# Patient Record
Sex: MALE | Race: WHITE | URBAN - METROPOLITAN AREA
[De-identification: names, ages, dates, MRNs, and addresses within clinical notes are randomized per-mention and may not be internally consistent; named-entity substitution may affect disease eponyms.]

---

## 2021-01-22 ENCOUNTER — LAB OUTSIDE AN ENCOUNTER (OUTPATIENT)
Dept: URBAN - METROPOLITAN AREA TELEHEALTH 2 | Facility: TELEHEALTH | Age: 61
End: 2021-01-22

## 2021-01-22 ENCOUNTER — OFFICE VISIT (OUTPATIENT)
Dept: URBAN - METROPOLITAN AREA TELEHEALTH 2 | Facility: TELEHEALTH | Age: 61
End: 2021-01-22
Payer: COMMERCIAL

## 2021-01-22 DIAGNOSIS — K50.80 CROHN'S COLITIS: ICD-10-CM

## 2021-01-22 DIAGNOSIS — Z91.89 COLON CANCER HIGH RISK: ICD-10-CM

## 2021-01-22 DIAGNOSIS — K21.9 GERD: ICD-10-CM

## 2021-01-22 DIAGNOSIS — R10.13 DYSPEPSIA: ICD-10-CM

## 2021-01-22 PROCEDURE — G8420 CALC BMI NORM PARAMETERS: HCPCS | Performed by: INTERNAL MEDICINE

## 2021-01-22 PROCEDURE — 99215 OFFICE O/P EST HI 40 MIN: CPT | Performed by: INTERNAL MEDICINE

## 2021-01-22 PROCEDURE — G9903 PT SCRN TBCO ID AS NON USER: HCPCS | Performed by: INTERNAL MEDICINE

## 2021-01-22 PROCEDURE — G8427 DOCREV CUR MEDS BY ELIG CLIN: HCPCS | Performed by: INTERNAL MEDICINE

## 2021-01-22 PROCEDURE — 1036F TOBACCO NON-USER: CPT | Performed by: INTERNAL MEDICINE

## 2021-01-22 PROCEDURE — 3017F COLORECTAL CA SCREEN DOC REV: CPT | Performed by: INTERNAL MEDICINE

## 2021-01-22 PROCEDURE — G9622 NO UNHEAL ETOH USER: HCPCS | Performed by: INTERNAL MEDICINE

## 2021-01-22 PROCEDURE — G8482 FLU IMMUNIZE ORDER/ADMIN: HCPCS | Performed by: INTERNAL MEDICINE

## 2021-01-22 RX ORDER — PHENAZOPYRIDINE HYDROCHLORIDE 100 MG/1
TABLET, COATED ORAL
Qty: 0 | Refills: 0 | Status: ACTIVE | COMMUNITY
Start: 1900-01-01 | End: 1900-01-01

## 2021-01-22 RX ORDER — PANTOPRAZOLE SODIUM 40 MG/1
1 TABLET TABLET, DELAYED RELEASE ORAL ONCE A DAY
Qty: 90 | Refills: 4 | OUTPATIENT
Start: 2021-01-22

## 2021-01-22 RX ORDER — ROSUVASTATIN CALCIUM 10 MG
TABLET ORAL
Qty: 0 | Refills: 0 | Status: ACTIVE | COMMUNITY
Start: 1900-01-01 | End: 1900-01-01

## 2021-01-22 RX ORDER — MESALAMINE 1.2 G/1
TAKE 2 TABLETS (2.4 GRAM) BY ORAL ROUTE ONCE DAILY TABLET, DELAYED RELEASE ORAL
Qty: 180 | Refills: 4 | Status: ACTIVE | COMMUNITY
Start: 2020-04-03 | End: 2021-06-27

## 2021-01-22 RX ORDER — PANTOPRAZOLE SODIUM 40 MG/1
TAKE 1 TABLET (40 MG) BY ORAL ROUTE ONCE DAILY 30 MINUTES BEFORE BREAKFAST. FOR 90 DAYS TABLET, DELAYED RELEASE ORAL 1
Qty: 90 | Refills: 4 | Status: ACTIVE | COMMUNITY
Start: 2020-04-22 | End: 2021-07-16

## 2021-01-22 RX ORDER — MESALAMINE 375 MG/1
4 CAPSULES IN THE MORNING CAPSULE, EXTENDED RELEASE ORAL ONCE A DAY
Qty: 360 CAPSULE | Refills: 4 | OUTPATIENT
Start: 2021-01-22 | End: 2022-04-17

## 2021-01-22 RX ORDER — MESALAMINE 375 MG/1
TAKE 4 CAPSULES (1,500 MG) BY ORAL ROUTE ONCE DAILY IN THE MORNING FOR 90 DAYS CAPSULE, EXTENDED RELEASE ORAL 1
Qty: 360 | Refills: 4 | Status: ACTIVE | COMMUNITY
Start: 2020-04-22 | End: 2021-07-16

## 2021-01-22 RX ORDER — SODIUM SULFATE, MAGNESIUM SULFATE, AND POTASSIUM CHLORIDE 17.75; 2.7; 2.25 G/1; G/1; G/1
12 TABLETS TABLET ORAL
Qty: 24 TABLET | Refills: 0 | OUTPATIENT
Start: 2021-01-22 | End: 2021-01-23

## 2021-01-22 RX ORDER — TELMISARTAN 80 MG/1
TAKE 1 TABLET (80 MG) BY ORAL ROUTE ONCE DAILY TABLET ORAL 1
Qty: 0 | Refills: 0 | Status: ACTIVE | COMMUNITY
Start: 1900-01-01 | End: 1900-01-01

## 2021-01-22 NOTE — HPI-OTHER HISTORIES
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 . Pt was referred by Dr. Abbasi. . He was diagnosed with CD around age 41.  He was started on Pentasa, which helped his sxs. . Previously on 4/22/2020, pt reports that overall he has done will.  Has had a lot of reflux issues, for which he went on gluten free diet.  He has taken Protonix (as needed) and omeprazole, when he has flare of GERD, but overall has good control of his disease.  He has started low FOD-MAP diet, which helped his bloating sxs and gaseous distention. . Today on 1/22/21, pt reports that he has variable diarrhea/constipation, for which he takes OTC laxatives.  GERD is also stable.  GERD is doing well as.  . SH: occasional etoh; no cig.   (online teaching software) FH: No IBD PMH: CD, HTN, hyperlipidemia . 11/2019: A patchy area of mucosa in the terminal ileum was mildly ulcerated.  Biopsies were taken with a cold forceps for histology.  Estimated blood loss was minimal. The remainder of the exam in the terminal ileum was normal. The anus, rectum, recto-sigmoid colon, sigmoid colon, descending colon, splenic flexure, transverse colon, hepatic flexure, ascending colon, cecum, appendiceal orifice and ileocecal valve appeared normal.  Two biopsies were obtained in the cecum with cold forceps for histology.  Estimated blood loss was minimal.  A Duodenumbiopsy: Duodenalmucosawithfoveolarmetaplasia Nohistologicevidenceofceliacsprueorinfectiousmicroorganisms.    B Gastricbodybiopsy: Gastricmucosawithfeaturesconsistentwithreactivegastropathy. NoHelicobacterpylorimicroorganismsidentifiedwithaspecialstain     C Esophagogastricjunctionbiopsy: Refluxesophagitis. Analcianblue/PASstainisnegativeforbothintestinalmetaplasiaandfungalelements.    D Terminalileumbiopsy: Activeileitis Nogranulomaordysplasiaseen     E Cecumbiopsy: Colonicmucosawithnosignificanthistopathology. Nohistologicevidenceofactivecolitis,granulomataordysplasia. .

## 2021-06-07 ENCOUNTER — WEB ENCOUNTER (OUTPATIENT)
Dept: URBAN - METROPOLITAN AREA CLINIC 98 | Facility: CLINIC | Age: 61
End: 2021-06-07

## 2021-06-07 RX ORDER — POLYETHYLENE GLYCOL 3350, SODIUM SULFATE, SODIUM CHLORIDE, POTASSIUM CHLORIDE, ASCORBIC ACID, SODIUM ASCORBATE 140-9-5.2G
1 KIT KIT ORAL ONCE
Qty: 1 | Refills: 1 | OUTPATIENT
Start: 2021-06-07 | End: 2021-06-09

## 2021-06-08 ENCOUNTER — WEB ENCOUNTER (OUTPATIENT)
Dept: URBAN - METROPOLITAN AREA CLINIC 96 | Facility: CLINIC | Age: 61
End: 2021-06-08

## 2021-06-08 RX ORDER — POLYETHYLENE GLYCOL 3350, SODIUM SULFATE, SODIUM CHLORIDE, POTASSIUM CHLORIDE, ASCORBIC ACID, SODIUM ASCORBATE 140-9-5.2G
1 KIT KIT ORAL ONCE
Qty: 1 | Refills: 1
Start: 2021-06-07 | End: 2021-06-11

## 2021-06-29 ENCOUNTER — WEB ENCOUNTER (OUTPATIENT)
Dept: URBAN - METROPOLITAN AREA CLINIC 96 | Facility: CLINIC | Age: 61
End: 2021-06-29

## 2021-07-08 ENCOUNTER — OFFICE VISIT (OUTPATIENT)
Dept: URBAN - METROPOLITAN AREA SURGERY CENTER 18 | Facility: SURGERY CENTER | Age: 61
End: 2021-07-08
Payer: COMMERCIAL

## 2021-07-08 ENCOUNTER — TELEPHONE ENCOUNTER (OUTPATIENT)
Dept: URBAN - METROPOLITAN AREA SURGERY CENTER 30 | Facility: SURGERY CENTER | Age: 61
End: 2021-07-08

## 2021-07-08 DIAGNOSIS — K31.89 ACQUIRED DEFORMITY OF DUODENUM: ICD-10-CM

## 2021-07-08 DIAGNOSIS — K29.30 CHRONIC SUPERFICIAL GASTRITIS: ICD-10-CM

## 2021-07-08 DIAGNOSIS — K50.80 CROHN'S COLITIS: ICD-10-CM

## 2021-07-08 DIAGNOSIS — Z53.8 FAILED ATTEMPTED SURGICAL PROCEDURE: ICD-10-CM

## 2021-07-08 PROCEDURE — 43239 EGD BIOPSY SINGLE/MULTIPLE: CPT | Performed by: INTERNAL MEDICINE

## 2021-07-08 PROCEDURE — G8907 PT DOC NO EVENTS ON DISCHARG: HCPCS | Performed by: INTERNAL MEDICINE

## 2021-07-08 PROCEDURE — 45378 DIAGNOSTIC COLONOSCOPY: CPT | Performed by: INTERNAL MEDICINE

## 2021-07-08 RX ORDER — PHENAZOPYRIDINE HYDROCHLORIDE 100 MG/1
TABLET, COATED ORAL
Qty: 0 | Refills: 0 | Status: ACTIVE | COMMUNITY
Start: 1900-01-01 | End: 1900-01-01

## 2021-07-08 RX ORDER — PANTOPRAZOLE SODIUM 40 MG/1
1 TABLET TABLET, DELAYED RELEASE ORAL ONCE A DAY
Qty: 90 | Refills: 4 | Status: ACTIVE | COMMUNITY
Start: 2021-01-22

## 2021-07-08 RX ORDER — PANTOPRAZOLE SODIUM 40 MG/1
TAKE 1 TABLET (40 MG) BY ORAL ROUTE ONCE DAILY 30 MINUTES BEFORE BREAKFAST. FOR 90 DAYS TABLET, DELAYED RELEASE ORAL 1
Qty: 90 | Refills: 4 | Status: ACTIVE | COMMUNITY
Start: 2020-04-22 | End: 2021-07-16

## 2021-07-08 RX ORDER — POLYETHYLENE GLYCOL 3350, SODIUM SULFATE ANHYDROUS, SODIUM BICARBONATE, SODIUM CHLORIDE, POTASSIUM CHLORIDE 236; 22.74; 6.74; 5.86; 2.97 G/4L; G/4L; G/4L; G/4L; G/4L
1 BOTTLE POWDER, FOR SOLUTION ORAL ONCE
Qty: 1 KIT | Refills: 0 | OUTPATIENT
Start: 2021-07-09 | End: 2021-07-10

## 2021-07-08 RX ORDER — TELMISARTAN 80 MG/1
TAKE 1 TABLET (80 MG) BY ORAL ROUTE ONCE DAILY TABLET ORAL 1
Qty: 0 | Refills: 0 | Status: ACTIVE | COMMUNITY
Start: 1900-01-01 | End: 1900-01-01

## 2021-07-08 RX ORDER — ROSUVASTATIN CALCIUM 10 MG
TABLET ORAL
Qty: 0 | Refills: 0 | Status: ACTIVE | COMMUNITY
Start: 1900-01-01 | End: 1900-01-01

## 2021-07-08 RX ORDER — MESALAMINE 375 MG/1
4 CAPSULES IN THE MORNING CAPSULE, EXTENDED RELEASE ORAL ONCE A DAY
Qty: 360 CAPSULE | Refills: 4 | Status: ACTIVE | COMMUNITY
Start: 2021-01-22 | End: 2022-04-17

## 2021-07-08 RX ORDER — MESALAMINE 375 MG/1
TAKE 4 CAPSULES (1,500 MG) BY ORAL ROUTE ONCE DAILY IN THE MORNING FOR 90 DAYS CAPSULE, EXTENDED RELEASE ORAL 1
Qty: 360 | Refills: 4 | Status: ACTIVE | COMMUNITY
Start: 2020-04-22 | End: 2021-07-16

## 2021-07-09 ENCOUNTER — LAB OUTSIDE AN ENCOUNTER (OUTPATIENT)
Dept: URBAN - METROPOLITAN AREA SURGERY CENTER 30 | Facility: SURGERY CENTER | Age: 61
End: 2021-07-09

## 2021-10-15 ENCOUNTER — OFFICE VISIT (OUTPATIENT)
Dept: URBAN - METROPOLITAN AREA MEDICAL CENTER 28 | Facility: MEDICAL CENTER | Age: 61
End: 2021-10-15
Payer: COMMERCIAL

## 2021-10-15 DIAGNOSIS — K50.80 CROHN'S COLITIS: ICD-10-CM

## 2021-10-15 DIAGNOSIS — D12.2 ADENOMA OF ASCENDING COLON: ICD-10-CM

## 2021-10-15 DIAGNOSIS — K63.89 BACTERIAL OVERGROWTH SYNDROME: ICD-10-CM

## 2021-10-15 PROCEDURE — 45380 COLONOSCOPY AND BIOPSY: CPT | Performed by: INTERNAL MEDICINE

## 2021-10-15 PROCEDURE — 45385 COLONOSCOPY W/LESION REMOVAL: CPT | Performed by: INTERNAL MEDICINE

## 2021-10-15 RX ORDER — ROSUVASTATIN CALCIUM 10 MG
TABLET ORAL
Qty: 0 | Refills: 0 | Status: ACTIVE | COMMUNITY
Start: 1900-01-01 | End: 1900-01-01

## 2021-10-15 RX ORDER — PANTOPRAZOLE SODIUM 40 MG/1
1 TABLET TABLET, DELAYED RELEASE ORAL ONCE A DAY
Qty: 90 | Refills: 4 | Status: ACTIVE | COMMUNITY
Start: 2021-01-22

## 2021-10-15 RX ORDER — PHENAZOPYRIDINE HYDROCHLORIDE 100 MG/1
TABLET, COATED ORAL
Qty: 0 | Refills: 0 | Status: ACTIVE | COMMUNITY
Start: 1900-01-01 | End: 1900-01-01

## 2021-10-15 RX ORDER — MESALAMINE 375 MG/1
4 CAPSULES IN THE MORNING CAPSULE, EXTENDED RELEASE ORAL ONCE A DAY
Qty: 360 CAPSULE | Refills: 4 | Status: ACTIVE | COMMUNITY
Start: 2021-01-22 | End: 2022-04-17

## 2021-10-15 RX ORDER — TELMISARTAN 80 MG/1
TAKE 1 TABLET (80 MG) BY ORAL ROUTE ONCE DAILY TABLET ORAL 1
Qty: 0 | Refills: 0 | Status: ACTIVE | COMMUNITY
Start: 1900-01-01 | End: 1900-01-01

## 2021-11-15 ENCOUNTER — OFFICE VISIT (OUTPATIENT)
Dept: URBAN - METROPOLITAN AREA TELEHEALTH 2 | Facility: TELEHEALTH | Age: 61
End: 2021-11-15
Payer: COMMERCIAL

## 2021-11-15 DIAGNOSIS — K50.80 CROHN'S COLITIS: ICD-10-CM

## 2021-11-15 DIAGNOSIS — K21.9 GERD: ICD-10-CM

## 2021-11-15 DIAGNOSIS — Z91.89 COLON CANCER HIGH RISK: ICD-10-CM

## 2021-11-15 DIAGNOSIS — R10.13 DYSPEPSIA: ICD-10-CM

## 2021-11-15 PROCEDURE — 99214 OFFICE O/P EST MOD 30 MIN: CPT | Performed by: INTERNAL MEDICINE

## 2021-11-15 RX ORDER — PANTOPRAZOLE SODIUM 40 MG/1
1 TABLET TABLET, DELAYED RELEASE ORAL ONCE A DAY
Qty: 90 | Refills: 4 | OUTPATIENT

## 2021-11-15 RX ORDER — PANTOPRAZOLE SODIUM 40 MG/1
1 TABLET TABLET, DELAYED RELEASE ORAL ONCE A DAY
Qty: 90 | Refills: 4 | Status: ACTIVE | COMMUNITY
Start: 2021-01-22

## 2021-11-15 RX ORDER — ROSUVASTATIN CALCIUM 10 MG
TABLET ORAL
Qty: 0 | Refills: 0 | Status: ACTIVE | COMMUNITY
Start: 1900-01-01

## 2021-11-15 RX ORDER — TELMISARTAN 80 MG/1
TAKE 1 TABLET (80 MG) BY ORAL ROUTE ONCE DAILY TABLET ORAL 1
Qty: 0 | Refills: 0 | Status: ACTIVE | COMMUNITY
Start: 1900-01-01

## 2021-11-15 RX ORDER — MESALAMINE 375 MG/1
4 CAPSULES IN THE MORNING CAPSULE, EXTENDED RELEASE ORAL ONCE A DAY
Qty: 360 CAPSULE | Refills: 4 | Status: ACTIVE | COMMUNITY
Start: 2021-01-22 | End: 2022-04-17

## 2021-11-15 RX ORDER — MESALAMINE 375 MG/1
4 CAPSULES IN THE MORNING CAPSULE, EXTENDED RELEASE ORAL ONCE A DAY
Qty: 360 CAPSULE | Refills: 4 | OUTPATIENT

## 2021-11-15 RX ORDER — PHENAZOPYRIDINE HYDROCHLORIDE 100 MG/1
TABLET, COATED ORAL
Qty: 0 | Refills: 0 | Status: ACTIVE | COMMUNITY
Start: 1900-01-01

## 2021-11-15 NOTE — HPI-TODAY'S VISIT:
Ronald Sepulveda is a 62 y/o indiv with ileal CD, currently on Apriso , here for evaluation of his condition. . Pt was referred by Dr. Abbasi. . He was diagnosed with CD around age 41.  He was started on Pentasa, which helped his sxs. . Previously on 4/22/2020, pt reports that overall he has done will.  Has had a lot of reflux issues, for which he went on gluten free diet.  He has taken Protonix (as needed) and omeprazole, when he has flare of GERD, but overall has good control of his disease.  He has started low FOD-MAP diet, which helped his bloating sxs and gaseous distention. . Previously on 1/22/21, pt reports that he has variable diarrhea/constipation, for which he takes OTC laxatives.  GERD is also stable.  GERD is doing well as. . Today on 11/15/2021, pt reports that he is doing very well.  Occasional constipation, no rectal bleeding, no diarrhea.  GERD doing well. . SH: occasional etoh; no cig.   (online teaching software) FH: No IBD PMH: CD, HTN, hyperlipidemia . 10/2021: Colonoscopy: Normal terminal ileum, no active inflammation. Tubular adenoma removed. . 11/2019: A patchy area of mucosa in the terminal ileum was mildly ulcerated.  Biopsies were taken with a cold forceps for histology.  Estimated blood loss was minimal. The remainder of the exam in the terminal ileum was normal. The anus, rectum, recto-sigmoid colon, sigmoid colon, descending colon, splenic flexure, transverse colon, hepatic flexure, ascending colon, cecum, appendiceal orifice and ileocecal valve appeared normal.  Two biopsies were obtained in the cecum with cold forceps for histology.  Estimated blood loss was minimal.  A Duodenumbiopsy: Duodenalmucosawithfoveolarmetaplasia Nohistologicevidenceofceliacsprueorinfectiousmicroorganisms.    B Gastricbodybiopsy: Gastricmucosawithfeaturesconsistentwithreactivegastropathy. NoHelicobacterpylorimicroorganismsidentifiedwithaspecialstain     C Esophagogastricjunctionbiopsy: Refluxesophagitis. Analcianblue/PASstainisnegativeforbothintestinalmetaplasiaandfungalelements.    D Terminalileumbiopsy: Activeileitis Nogranulomaordysplasiaseen     E Cecumbiopsy: Colonicmucosawithnosignificanthistopathology. Nohistologicevidenceofactivecolitis,granulomataordysplasia.

## 2022-05-04 ENCOUNTER — WEB ENCOUNTER (OUTPATIENT)
Dept: URBAN - METROPOLITAN AREA CLINIC 96 | Facility: CLINIC | Age: 62
End: 2022-05-04

## 2022-05-05 ENCOUNTER — WEB ENCOUNTER (OUTPATIENT)
Dept: URBAN - METROPOLITAN AREA CLINIC 96 | Facility: CLINIC | Age: 62
End: 2022-05-05

## 2022-05-10 ENCOUNTER — OFFICE VISIT (OUTPATIENT)
Dept: URBAN - METROPOLITAN AREA CLINIC 96 | Facility: CLINIC | Age: 62
End: 2022-05-10

## 2022-05-20 ENCOUNTER — WEB ENCOUNTER (OUTPATIENT)
Dept: URBAN - METROPOLITAN AREA TELEHEALTH 2 | Facility: TELEHEALTH | Age: 62
End: 2022-05-20

## 2022-05-26 ENCOUNTER — OFFICE VISIT (OUTPATIENT)
Dept: URBAN - METROPOLITAN AREA TELEHEALTH 2 | Facility: TELEHEALTH | Age: 62
End: 2022-05-26
Payer: COMMERCIAL

## 2022-05-26 DIAGNOSIS — Z91.89 COLON CANCER HIGH RISK: ICD-10-CM

## 2022-05-26 DIAGNOSIS — K50.90 CROHN DISEASE: ICD-10-CM

## 2022-05-26 DIAGNOSIS — R10.13 DYSPEPSIA: ICD-10-CM

## 2022-05-26 DIAGNOSIS — K50.80 CROHN'S COLITIS: ICD-10-CM

## 2022-05-26 DIAGNOSIS — K21.9 GERD: ICD-10-CM

## 2022-05-26 DIAGNOSIS — Z09 FOLLOW UP: ICD-10-CM

## 2022-05-26 DIAGNOSIS — Z71.89 VACCINE COUNSELING: ICD-10-CM

## 2022-05-26 PROCEDURE — 99214 OFFICE O/P EST MOD 30 MIN: CPT | Performed by: INTERNAL MEDICINE

## 2022-05-26 RX ORDER — MESALAMINE 375 MG/1
4 CAPSULES IN THE MORNING CAPSULE, EXTENDED RELEASE ORAL ONCE A DAY
Qty: 360 CAPSULE | Refills: 4 | Status: ACTIVE | COMMUNITY

## 2022-05-26 RX ORDER — HYOSCYAMINE SULFATE 0.12 MG/1
1 TABLET AS NEEDED TABLET ORAL
Qty: 60 | Refills: 3 | OUTPATIENT
Start: 2022-05-26 | End: 2022-09-23

## 2022-05-26 RX ORDER — MESALAMINE 375 MG/1
4 CAPSULES IN THE MORNING CAPSULE, EXTENDED RELEASE ORAL ONCE A DAY
Qty: 360 CAPSULE | Refills: 4 | OUTPATIENT

## 2022-05-26 RX ORDER — ROSUVASTATIN CALCIUM 10 MG
TABLET ORAL
Qty: 0 | Refills: 0 | Status: ACTIVE | COMMUNITY
Start: 1900-01-01

## 2022-05-26 RX ORDER — TELMISARTAN 80 MG/1
TAKE 1 TABLET (80 MG) BY ORAL ROUTE ONCE DAILY TABLET ORAL 1
Qty: 0 | Refills: 0 | Status: ACTIVE | COMMUNITY
Start: 1900-01-01

## 2022-05-26 RX ORDER — PANTOPRAZOLE SODIUM 40 MG/1
1 TABLET TABLET, DELAYED RELEASE ORAL ONCE A DAY
Qty: 90 | Refills: 4 | Status: ACTIVE | COMMUNITY

## 2022-05-26 RX ORDER — PHENAZOPYRIDINE HYDROCHLORIDE 100 MG/1
TABLET, COATED ORAL
Qty: 0 | Refills: 0 | Status: ACTIVE | COMMUNITY
Start: 1900-01-01

## 2022-05-26 RX ORDER — PANTOPRAZOLE SODIUM 40 MG/1
1 TABLET TABLET, DELAYED RELEASE ORAL ONCE A DAY
Qty: 90 | Refills: 4 | OUTPATIENT

## 2022-05-26 NOTE — HPI-TODAY'S VISIT:
Ronald Sepulveda is a 63 y/o indiv with ileal CD, currently on Apriso , here for evaluation of his condition. . Pt was referred by Dr. Abbasi. . He was diagnosed with CD around age 41.  He was started on Pentasa, which helped his sxs. . Previously on 4/22/2020, pt reports that overall he has done will.  Has had a lot of reflux issues, for which he went on gluten free diet.  He has taken Protonix (as needed) and omeprazole, when he has flare of GERD, but overall has good control of his disease.  He has started low FOD-MAP diet, which helped his bloating sxs and gaseous distention. . Previously on 1/22/21, pt reports that he has variable diarrhea/constipation, for which he takes OTC laxatives.  GERD is also stable.  GERD is doing well as. . Previously on 11/15/2021, pt reports that he is doing very well.  Occasional constipation, no rectal bleeding, no diarrhea.  GERD doing well. . Today on 5/26/2022, pt reports that his PCP changed his protonix to Pepcid due to concerns of osteoporosis.  He has been doing okay on the Pepcid.  He was dxd with prostatitis and is going to be taking Bactrim.    SH: occasional etoh; no cig.   (online teaching software) FH: No IBD PMH: CD, HTN, hyperlipidemia . 10/2021: Colonoscopy: Normal terminal ileum, no active inflammation. Tubular adenoma removed. . 11/2019: A patchy area of mucosa in the terminal ileum was mildly ulcerated.  Biopsies were taken with a cold forceps for histology.  Estimated blood loss was minimal. The remainder of the exam in the terminal ileum was normal. The anus, rectum, recto-sigmoid colon, sigmoid colon, descending colon, splenic flexure, transverse colon, hepatic flexure, ascending colon, cecum, appendiceal orifice and ileocecal valve appeared normal.  Two biopsies were obtained in the cecum with cold forceps for histology.  Estimated blood loss was minimal.  A Duodenumbiopsy: Duodenalmucosawithfoveolarmetaplasia Nohistologicevidenceofceliacsprueorinfectiousmicroorganisms.    B Gastricbodybiopsy: Gastricmucosawithfeaturesconsistentwithreactivegastropathy. NoHelicobacterpylorimicroorganismsidentifiedwithaspecialstain     C Esophagogastricjunctionbiopsy: Refluxesophagitis. Analcianblue/PASstainisnegativeforbothintestinalmetaplasiaandfungalelements.    D Terminalileumbiopsy: Activeileitis Nogranulomaordysplasiaseen     E Cecumbiopsy: Colonicmucosawithnosignificanthistopathology. Nohistologicevidenceofactivecolitis,granulomataordysplasia. .

## 2023-03-22 ENCOUNTER — OFFICE VISIT (OUTPATIENT)
Dept: URBAN - METROPOLITAN AREA CLINIC 96 | Facility: CLINIC | Age: 63
End: 2023-03-22

## 2023-06-14 ENCOUNTER — OFFICE VISIT (OUTPATIENT)
Dept: URBAN - METROPOLITAN AREA CLINIC 96 | Facility: CLINIC | Age: 63
End: 2023-06-14

## 2023-07-11 ENCOUNTER — TELEPHONE ENCOUNTER (OUTPATIENT)
Dept: URBAN - METROPOLITAN AREA CLINIC 96 | Facility: CLINIC | Age: 63
End: 2023-07-11

## 2023-07-11 ENCOUNTER — WEB ENCOUNTER (OUTPATIENT)
Dept: URBAN - METROPOLITAN AREA CLINIC 96 | Facility: CLINIC | Age: 63
End: 2023-07-11

## 2023-07-11 ENCOUNTER — LAB OUTSIDE AN ENCOUNTER (OUTPATIENT)
Dept: URBAN - METROPOLITAN AREA CLINIC 96 | Facility: CLINIC | Age: 63
End: 2023-07-11

## 2023-07-11 ENCOUNTER — DASHBOARD ENCOUNTERS (OUTPATIENT)
Age: 63
End: 2023-07-11

## 2023-07-11 ENCOUNTER — OFFICE VISIT (OUTPATIENT)
Dept: URBAN - METROPOLITAN AREA CLINIC 96 | Facility: CLINIC | Age: 63
End: 2023-07-11
Payer: COMMERCIAL

## 2023-07-11 VITALS
HEIGHT: 68 IN | WEIGHT: 207 LBS | SYSTOLIC BLOOD PRESSURE: 114 MMHG | BODY MASS INDEX: 31.37 KG/M2 | TEMPERATURE: 98.6 F | HEART RATE: 78 BPM | DIASTOLIC BLOOD PRESSURE: 75 MMHG

## 2023-07-11 DIAGNOSIS — Z91.89 COLON CANCER HIGH RISK: ICD-10-CM

## 2023-07-11 DIAGNOSIS — K21.9 GERD: ICD-10-CM

## 2023-07-11 DIAGNOSIS — R10.13 DYSPEPSIA: ICD-10-CM

## 2023-07-11 DIAGNOSIS — K50.80 CROHN'S COLITIS: ICD-10-CM

## 2023-07-11 DIAGNOSIS — Z09 FOLLOW UP: ICD-10-CM

## 2023-07-11 PROCEDURE — 99214 OFFICE O/P EST MOD 30 MIN: CPT | Performed by: INTERNAL MEDICINE

## 2023-07-11 RX ORDER — POLYETHYLENE GLYCOL 3350, SODIUM SULFATE ANHYDROUS, SODIUM BICARBONATE, SODIUM CHLORIDE, POTASSIUM CHLORIDE 236; 22.74; 6.74; 5.86; 2.97 G/4L; G/4L; G/4L; G/4L; G/4L
1 BOTTLE POWDER, FOR SOLUTION ORAL ONCE
Qty: 1 KIT | Refills: 0 | OUTPATIENT
Start: 2023-07-11 | End: 2023-07-12

## 2023-07-11 RX ORDER — PANTOPRAZOLE SODIUM 40 MG/1
1 TABLET TABLET, DELAYED RELEASE ORAL ONCE A DAY
Qty: 90 | Refills: 4 | Status: ACTIVE | COMMUNITY

## 2023-07-11 RX ORDER — MESALAMINE 375 MG/1
4 CAPSULES IN THE MORNING CAPSULE, EXTENDED RELEASE ORAL ONCE A DAY
Qty: 360 CAPSULE | Refills: 4 | Status: ACTIVE | COMMUNITY

## 2023-07-11 RX ORDER — MESALAMINE 375 MG/1
4 CAPSULES IN THE MORNING CAPSULE, EXTENDED RELEASE ORAL ONCE A DAY
Qty: 360 CAPSULE | Refills: 4 | OUTPATIENT

## 2023-07-11 RX ORDER — PHENAZOPYRIDINE HYDROCHLORIDE 100 MG/1
TABLET, COATED ORAL
Qty: 0 | Refills: 0 | Status: ACTIVE | COMMUNITY
Start: 1900-01-01

## 2023-07-11 RX ORDER — ROSUVASTATIN CALCIUM 10 MG
TABLET ORAL
Qty: 0 | Refills: 0 | Status: ACTIVE | COMMUNITY
Start: 1900-01-01

## 2023-07-11 RX ORDER — PANTOPRAZOLE SODIUM 40 MG/1
1 TABLET TABLET, DELAYED RELEASE ORAL ONCE A DAY
Qty: 90 | Refills: 4 | OUTPATIENT

## 2023-07-11 RX ORDER — TELMISARTAN 80 MG/1
TAKE 1 TABLET (80 MG) BY ORAL ROUTE ONCE DAILY TABLET ORAL 1
Qty: 0 | Refills: 0 | Status: ACTIVE | COMMUNITY
Start: 1900-01-01

## 2023-07-11 NOTE — HPI-TODAY'S VISIT:
Pt Coco is a 64 y/o indiv with ileal CD, currently on Apriso , here for evaluation of his condition. . Pt was referred by Dr. Abbasi. . He was diagnosed with CD around age 41.  He was started on Pentasa, which helped his sxs. . Previously on 4/22/2020, pt reports that overall he has done will.  Has had a lot of reflux issues, for which he went on gluten free diet.  He has taken Protonix (as needed) and omeprazole, when he has flare of GERD, but overall has good control of his disease.  He has started low FOD-MAP diet, which helped his bloating sxs and gaseous distention. . Previously on 1/22/21, pt reports that he has variable diarrhea/constipation, for which he takes OTC laxatives.  GERD is also stable.  GERD is doing well as. . Previously on 11/15/2021, pt reports that he is doing very well.  Occasional constipation, no rectal bleeding, no diarrhea.  GERD doing well. . Previously on 5/26/2022, pt reports that his PCP changed his protonix to Pepcid due to concerns of osteoporosis.  He has been doing okay on the Pepcid.  He was dxd with prostatitis and is going to be taking Bactrim.   . Today on 7/11/2023, pt reports that he gets occasional stool balls/constipation, does take some fiber;  Occasional diarrhea episode.  No abd pain.  No blood in the stool.  No longer having prostate issues.  SH: occasional etoh; no cig.   (online teaching software) FH: No IBD PMH: CD, HTN, hyperlipidemia . 10/2021: Colonoscopy: Normal terminal ileum, no active inflammation. Tubular adenoma removed. . 11/2019: A patchy area of mucosa in the terminal ileum was mildly ulcerated.  Biopsies were taken with a cold forceps for histology.  Estimated blood loss was minimal. The remainder of the exam in the terminal ileum was normal. The anus, rectum, recto-sigmoid colon, sigmoid colon, descending colon, splenic flexure, transverse colon, hepatic flexure, ascending colon, cecum, appendiceal orifice and ileocecal valve appeared normal.  Two biopsies were obtained in the cecum with cold forceps for histology.  Estimated blood loss was minimal.  A Duodenumbiopsy: Duodenalmucosawithfoveolarmetaplasia Nohistologicevidenceofceliacsprueorinfectiousmicroorganisms.    B Gastricbodybiopsy: Gastricmucosawithfeaturesconsistentwithreactivegastropathy. NoHelicobacterpylorimicroorganismsidentifiedwithaspecialstain     C Esophagogastricjunctionbiopsy: Refluxesophagitis. Analcianblue/PASstainisnegativeforbothintestinalmetaplasiaandfungalelements.    D Terminalileumbiopsy: Activeileitis Nogranulomaordysplasiaseen     E Cecumbiopsy: Colonicmucosawithnosignificanthistopathology. Nohistologicevidenceofactivecolitis,granulomataordysplasia. . SH: on [employer] of [employer]

## 2023-08-11 ENCOUNTER — OFFICE VISIT (OUTPATIENT)
Dept: URBAN - METROPOLITAN AREA MEDICAL CENTER 28 | Facility: MEDICAL CENTER | Age: 63
End: 2023-08-11
Payer: COMMERCIAL

## 2023-08-11 ENCOUNTER — WEB ENCOUNTER (OUTPATIENT)
Dept: URBAN - METROPOLITAN AREA CLINIC 96 | Facility: CLINIC | Age: 63
End: 2023-08-11

## 2023-08-11 DIAGNOSIS — K50.00 CICATRIZING ENTEROCOLITIS: ICD-10-CM

## 2023-08-11 DIAGNOSIS — K30 ACID INDIGESTION: ICD-10-CM

## 2023-08-11 DIAGNOSIS — K31.89 ACQUIRED DEFORMITY OF DUODENUM: ICD-10-CM

## 2023-08-11 DIAGNOSIS — K21.9 ACID REFLUX: ICD-10-CM

## 2023-08-11 PROCEDURE — 43239 EGD BIOPSY SINGLE/MULTIPLE: CPT | Performed by: INTERNAL MEDICINE

## 2023-08-11 PROCEDURE — 45378 DIAGNOSTIC COLONOSCOPY: CPT | Performed by: INTERNAL MEDICINE

## 2023-08-11 RX ORDER — MESALAMINE 375 MG/1
4 CAPSULES IN THE MORNING CAPSULE, EXTENDED RELEASE ORAL ONCE A DAY
Qty: 360 CAPSULE | Refills: 4 | Status: ACTIVE | COMMUNITY

## 2023-08-11 RX ORDER — POLYETHYLENE GLYCOL 3350, SODIUM SULFATE ANHYDROUS, SODIUM BICARBONATE, SODIUM CHLORIDE, POTASSIUM CHLORIDE 236; 22.74; 6.74; 5.86; 2.97 G/4L; G/4L; G/4L; G/4L; G/4L
1 BOTTLE POWDER, FOR SOLUTION ORAL ONCE
Qty: 1 KIT | Refills: 0 | OUTPATIENT
Start: 2023-08-16 | End: 2023-08-17

## 2023-08-11 RX ORDER — ROSUVASTATIN CALCIUM 10 MG
TABLET ORAL
Qty: 0 | Refills: 0 | Status: ACTIVE | COMMUNITY
Start: 1900-01-01

## 2023-08-11 RX ORDER — PHENAZOPYRIDINE HYDROCHLORIDE 100 MG/1
TABLET, COATED ORAL
Qty: 0 | Refills: 0 | Status: ACTIVE | COMMUNITY
Start: 1900-01-01

## 2023-08-11 RX ORDER — PANTOPRAZOLE SODIUM 40 MG/1
1 TABLET TABLET, DELAYED RELEASE ORAL ONCE A DAY
Qty: 90 | Refills: 4 | Status: ACTIVE | COMMUNITY

## 2023-08-11 RX ORDER — TELMISARTAN 80 MG/1
TAKE 1 TABLET (80 MG) BY ORAL ROUTE ONCE DAILY TABLET ORAL 1
Qty: 0 | Refills: 0 | Status: ACTIVE | COMMUNITY
Start: 1900-01-01

## 2023-08-16 ENCOUNTER — LAB OUTSIDE AN ENCOUNTER (OUTPATIENT)
Dept: URBAN - METROPOLITAN AREA CLINIC 96 | Facility: CLINIC | Age: 63
End: 2023-08-16

## 2023-08-18 ENCOUNTER — TELEPHONE ENCOUNTER (OUTPATIENT)
Dept: URBAN - METROPOLITAN AREA CLINIC 96 | Facility: CLINIC | Age: 63
End: 2023-08-18

## 2023-09-26 ENCOUNTER — WEB ENCOUNTER (OUTPATIENT)
Dept: URBAN - METROPOLITAN AREA CLINIC 96 | Facility: CLINIC | Age: 63
End: 2023-09-26

## 2023-09-29 ENCOUNTER — OFFICE VISIT (OUTPATIENT)
Dept: URBAN - METROPOLITAN AREA MEDICAL CENTER 28 | Facility: MEDICAL CENTER | Age: 63
End: 2023-09-29
Payer: COMMERCIAL

## 2023-09-29 ENCOUNTER — LAB OUTSIDE AN ENCOUNTER (OUTPATIENT)
Dept: URBAN - METROPOLITAN AREA CLINIC 96 | Facility: CLINIC | Age: 63
End: 2023-09-29

## 2023-09-29 DIAGNOSIS — K63.89 APPENDICITIS EPIPLOICA: ICD-10-CM

## 2023-09-29 DIAGNOSIS — K50.80 CROHN'S COLITIS: ICD-10-CM

## 2023-09-29 LAB
GLUCOSE POC: 103
PERFORMING LAB: (no result)

## 2023-09-29 PROCEDURE — 45380 COLONOSCOPY AND BIOPSY: CPT | Performed by: INTERNAL MEDICINE

## 2023-09-29 RX ORDER — PANTOPRAZOLE SODIUM 40 MG/1
1 TABLET TABLET, DELAYED RELEASE ORAL ONCE A DAY
Qty: 90 | Refills: 4 | Status: ACTIVE | COMMUNITY

## 2023-09-29 RX ORDER — MESALAMINE 375 MG/1
4 CAPSULES IN THE MORNING CAPSULE, EXTENDED RELEASE ORAL ONCE A DAY
Qty: 360 CAPSULE | Refills: 4 | Status: ACTIVE | COMMUNITY

## 2023-09-29 RX ORDER — PHENAZOPYRIDINE HYDROCHLORIDE 100 MG/1
TABLET, COATED ORAL
Qty: 0 | Refills: 0 | Status: ACTIVE | COMMUNITY
Start: 1900-01-01

## 2023-09-29 RX ORDER — TELMISARTAN 80 MG/1
TAKE 1 TABLET (80 MG) BY ORAL ROUTE ONCE DAILY TABLET ORAL 1
Qty: 0 | Refills: 0 | Status: ACTIVE | COMMUNITY
Start: 1900-01-01

## 2023-09-29 RX ORDER — ROSUVASTATIN CALCIUM 10 MG
TABLET ORAL
Qty: 0 | Refills: 0 | Status: ACTIVE | COMMUNITY
Start: 1900-01-01

## 2023-09-29 NOTE — HPI-TODAY'S VISIT:
9/29/2023:  We used  a small bowel enteroscope which allowed us to get to cecum.  We were not able to intubate the ileum  (due to lack of tension in the scope). - A less than 5 mm polyp was found in the ascending colon.  The polyp was sessile.  The polyp was removed with a  jumbo cold forceps.  Resection and retrieval were complete.  The pathology specimen was placed into Bottle Number 1.  - The exam was otherwise without abnormality on direct and retroflexion views.  No active Crohns seen.

## 2023-10-04 ENCOUNTER — WEB ENCOUNTER (OUTPATIENT)
Dept: URBAN - METROPOLITAN AREA CLINIC 96 | Facility: CLINIC | Age: 63
End: 2023-10-04

## 2023-10-04 RX ORDER — MESALAMINE 375 MG/1
4 CAPSULES IN THE MORNING CAPSULE, EXTENDED RELEASE ORAL ONCE A DAY
Qty: 360 CAPSULE | Refills: 4 | OUTPATIENT
Start: 2023-10-05 | End: 2024-12-27

## 2023-10-04 RX ORDER — MESALAMINE 375 MG/1
4 CAPSULES IN THE MORNING CAPSULE, EXTENDED RELEASE ORAL ONCE A DAY
Qty: 360 | Refills: 0
End: 2024-01-03

## 2023-10-05 ENCOUNTER — WEB ENCOUNTER (OUTPATIENT)
Dept: URBAN - METROPOLITAN AREA CLINIC 98 | Facility: CLINIC | Age: 63
End: 2023-10-05

## 2023-10-05 RX ORDER — MESALAMINE 375 MG/1
4 CAPSULES IN THE MORNING CAPSULE, EXTENDED RELEASE ORAL ONCE A DAY
Qty: 360 | Refills: 4
Start: 2023-10-05 | End: 2024-12-27

## 2023-10-06 ENCOUNTER — WEB ENCOUNTER (OUTPATIENT)
Dept: URBAN - METROPOLITAN AREA CLINIC 96 | Facility: CLINIC | Age: 63
End: 2023-10-06

## 2023-10-16 ENCOUNTER — WEB ENCOUNTER (OUTPATIENT)
Dept: URBAN - METROPOLITAN AREA CLINIC 96 | Facility: CLINIC | Age: 63
End: 2023-10-16

## 2024-01-11 ENCOUNTER — WEB ENCOUNTER (OUTPATIENT)
Dept: URBAN - METROPOLITAN AREA CLINIC 98 | Facility: CLINIC | Age: 64
End: 2024-01-11

## 2024-10-09 ENCOUNTER — OFFICE VISIT (OUTPATIENT)
Dept: URBAN - METROPOLITAN AREA CLINIC 96 | Facility: CLINIC | Age: 64
End: 2024-10-09

## 2024-11-27 ENCOUNTER — OFFICE VISIT (OUTPATIENT)
Dept: URBAN - METROPOLITAN AREA CLINIC 96 | Facility: CLINIC | Age: 64
End: 2024-11-27
Payer: COMMERCIAL

## 2024-11-27 VITALS
BODY MASS INDEX: 29.98 KG/M2 | DIASTOLIC BLOOD PRESSURE: 87 MMHG | WEIGHT: 197.8 LBS | HEIGHT: 68 IN | HEART RATE: 75 BPM | SYSTOLIC BLOOD PRESSURE: 138 MMHG

## 2024-11-27 DIAGNOSIS — R10.13 DYSPEPSIA: ICD-10-CM

## 2024-11-27 DIAGNOSIS — Z09 FOLLOW UP: ICD-10-CM

## 2024-11-27 DIAGNOSIS — Z91.89 COLON CANCER HIGH RISK: ICD-10-CM

## 2024-11-27 DIAGNOSIS — K21.9 GERD: ICD-10-CM

## 2024-11-27 DIAGNOSIS — K50.00 CROHN'S DISEASE OF ILEUM WITHOUT COMPLICATION: ICD-10-CM

## 2024-11-27 PROBLEM — 38106008: Status: ACTIVE | Noted: 2024-11-27

## 2024-11-27 PROCEDURE — 99213 OFFICE O/P EST LOW 20 MIN: CPT | Performed by: INTERNAL MEDICINE

## 2024-11-27 RX ORDER — TELMISARTAN 80 MG/1
TAKE 1 TABLET (80 MG) BY ORAL ROUTE ONCE DAILY TABLET ORAL 1
Qty: 0 | Refills: 0 | Status: ACTIVE | COMMUNITY
Start: 1900-01-01

## 2024-11-27 RX ORDER — PANTOPRAZOLE SODIUM 40 MG/1
1 TABLET TABLET, DELAYED RELEASE ORAL ONCE A DAY
Qty: 90 | Refills: 4 | Status: ACTIVE | COMMUNITY

## 2024-11-27 RX ORDER — PHENAZOPYRIDINE HYDROCHLORIDE 100 MG/1
TABLET, COATED ORAL
Qty: 0 | Refills: 0 | Status: ACTIVE | COMMUNITY
Start: 1900-01-01

## 2024-11-27 RX ORDER — MESALAMINE 375 MG/1
4 CAPSULES IN THE MORNING CAPSULE, EXTENDED RELEASE ORAL ONCE A DAY
Qty: 360 | Refills: 4 | Status: ACTIVE | COMMUNITY
Start: 2023-10-05 | End: 2024-12-27

## 2024-11-27 RX ORDER — ROSUVASTATIN CALCIUM 10 MG
TABLET ORAL
Qty: 0 | Refills: 0 | Status: ACTIVE | COMMUNITY
Start: 1900-01-01

## 2024-11-27 RX ORDER — PANTOPRAZOLE SODIUM 40 MG/1
1 TABLET TABLET, DELAYED RELEASE ORAL ONCE A DAY
Qty: 90 | Refills: 4 | OUTPATIENT

## 2024-11-27 RX ORDER — MESALAMINE 375 MG/1
4 CAPSULES IN THE MORNING CAPSULE, EXTENDED RELEASE ORAL ONCE A DAY
Qty: 360 CAPSULE | Refills: 4 | OUTPATIENT

## 2024-11-27 NOTE — HPI-TODAY'S VISIT:
Pt Coco is a 63 y/o indiv with ileal CD, currently on Apriso , here for evaluation of his condition. . Pt was referred by Dr. Abbasi. . He was diagnosed with CD around age 41.  He was started on Pentasa, which helped his sxs. . Previously on 4/22/2020, pt reports that overall he has done will.  Has had a lot of reflux issues, for which he went on gluten free diet.  He has taken Protonix (as needed) and omeprazole, when he has flare of GERD, but overall has good control of his disease.  He has started low FOD-MAP diet, which helped his bloating sxs and gaseous distention. . Previously on 1/22/21, pt reports that he has variable diarrhea/constipation, for which he takes OTC laxatives.  GERD is also stable.  GERD is doing well as. . Previously on 11/15/2021, pt reports that he is doing very well.  Occasional constipation, no rectal bleeding, no diarrhea.  GERD doing well. . Previously on 5/26/2022, pt reports that his PCP changed his protonix to Pepcid due to concerns of osteoporosis.  He has been doing okay on the Pepcid.  He was dxd with prostatitis and is going to be taking Bactrim.   . Prev on 7/11/2023, pt reports that he gets occasional stool balls/constipation, does take some fiber;  Occasional diarrhea episode.  No abd pain.  No blood in the stool.  No longer having prostate issues. . Today on 11/27/2024, pt reports that he has started zepbound, has seen some GERD sxs for this, was on 40 mg protonix for this.  Drinking plenty of water, eating plenty of fiber. . SH: occasional etoh; no cig.   (online teaching software) FH: No IBD PMH: CD, HTN, hyperlipidemia . 9/29/2023: We used a small bowel enteroscope which allowed us to get to cecum. We were not able to intubate the ileum (due to lack of tension in the scope). - A less than 5 mm polyp was found in the ascending colon. The polyp was sessile. The polyp was removed with a jumbo cold forceps. Resection and retrieval were complete. The pathology specimen was placed into Bottle Number 1. - The exam was otherwise without abnormality on direct and retroflexion views. No active Crohns seen. . 10/2021: Colonoscopy: Normal terminal ileum, no active inflammation. Tubular adenoma removed. . 11/2019: A patchy area of mucosa in the terminal ileum was mildly ulcerated.  Biopsies were taken with a cold forceps for histology.  Estimated blood loss was minimal. The remainder of the exam in the terminal ileum was normal. The anus, rectum, recto-sigmoid colon, sigmoid colon, descending colon, splenic flexure, transverse colon, hepatic flexure, ascending colon, cecum, appendiceal orifice and ileocecal valve appeared normal.  Two biopsies were obtained in the cecum with cold forceps for histology.  Estimated blood loss was minimal.  A Duodenumbiopsy: Duodenalmucosawithfoveolarmetaplasia Nohistologicevidenceofceliacsprueorinfectiousmicroorganisms.    B Gastricbodybiopsy: Gastricmucosawithfeaturesconsistentwithreactivegastropathy. NoHelicobacterpylorimicroorganismsidentifiedwithaspecialstain     C Esophagogastricjunctionbiopsy: Refluxesophagitis. Analcianblue/PASstainisnegativeforbothintestinalmetaplasiaandfungalelements.    D Terminalileumbiopsy: Activeileitis Nogranulomaordysplasiaseen     E Cecumbiopsy: Colonicmucosawithnosignificanthistopathology. Nohistologicevidenceofactivecolitis,granulomataordysplasia. . SH: on [employer] of [facility]

## 2024-12-04 ENCOUNTER — WEB ENCOUNTER (OUTPATIENT)
Dept: URBAN - METROPOLITAN AREA CLINIC 96 | Facility: CLINIC | Age: 64
End: 2024-12-04

## 2024-12-04 RX ORDER — MESALAMINE 375 MG/1
4 CAPSULES IN THE MORNING CAPSULE, EXTENDED RELEASE ORAL ONCE A DAY
Qty: 360 CAPSULE | Refills: 4
End: 2026-02-28

## 2024-12-06 ENCOUNTER — WEB ENCOUNTER (OUTPATIENT)
Dept: URBAN - METROPOLITAN AREA CLINIC 96 | Facility: CLINIC | Age: 64
End: 2024-12-06

## 2024-12-06 RX ORDER — MESALAMINE 375 MG/1
4 CAPSULES IN THE MORNING CAPSULE, EXTENDED RELEASE ORAL ONCE A DAY
Qty: 360 CAPSULE | Refills: 4
End: 2026-03-04

## 2024-12-09 ENCOUNTER — WEB ENCOUNTER (OUTPATIENT)
Dept: URBAN - METROPOLITAN AREA CLINIC 96 | Facility: CLINIC | Age: 64
End: 2024-12-09